# Patient Record
(demographics unavailable — no encounter records)

---

## 2024-10-14 NOTE — HISTORY OF PRESENT ILLNESS
[Full time] : Work status: full time [de-identified] : 10/14/2024: 53 yo RHD F - pt is here today for neck pain, pain started from the beginning of august after going on a drive with her daughter. next morning, pt states she hwas throwing up and severe headaches and then was seen at the hospital.  the pain not down the arms - no loss of fine motor - no loss of bb control   pt states she did have a CT completed and MRI completed.   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

## 2024-10-14 NOTE — DISCUSSION/SUMMARY
[de-identified] : reviewed the case and the imaging with the patient  cervical pain  discussion of the condition and treatment options cautions discussed questions answered discussion of natural history of the condition and what the next step would be mRI of the C spine  can set up if needed